# Patient Record
Sex: MALE | Race: AMERICAN INDIAN OR ALASKA NATIVE
[De-identification: names, ages, dates, MRNs, and addresses within clinical notes are randomized per-mention and may not be internally consistent; named-entity substitution may affect disease eponyms.]

---

## 2019-11-18 ENCOUNTER — HOSPITAL ENCOUNTER (OUTPATIENT)
Dept: HOSPITAL 5 - XRAY | Age: 69
Discharge: HOME | End: 2019-11-18
Attending: FAMILY MEDICINE
Payer: MEDICARE

## 2019-11-18 DIAGNOSIS — M48.02: Primary | ICD-10-CM

## 2019-11-18 DIAGNOSIS — M47.812: ICD-10-CM

## 2019-11-18 DIAGNOSIS — G62.9: ICD-10-CM

## 2019-11-18 PROCEDURE — 72050 X-RAY EXAM NECK SPINE 4/5VWS: CPT

## 2024-04-24 NOTE — XRAY REPORT
CERVICAL SPINE, 5 VIEWS

11/18/2019



INDICATION / CLINICAL INFORMATION:

CERVICAL PAIN.



COMPARISON:

None available.



FINDINGS:

No fracture.

Moderate disc space narrowing is demonstrated at C3 3-C4 and C6-C7.

Hypertrophic vertebral body degenerative changes are noted ventrally from C3 through C6.

Diffuse facet joint degeneration.

Alignment is well maintained.



Signer Name: Jeremías George MD 

Signed: 11/18/2019 4:06 PM

 Workstation Name: [id number]